# Patient Record
Sex: FEMALE
[De-identification: names, ages, dates, MRNs, and addresses within clinical notes are randomized per-mention and may not be internally consistent; named-entity substitution may affect disease eponyms.]

---

## 2024-06-21 ENCOUNTER — NON-APPOINTMENT (OUTPATIENT)
Age: 64
End: 2024-06-21

## 2024-06-21 DIAGNOSIS — M77.51 OTHER ENTHESOPATHY OF RT FOOT AND ANKLE: ICD-10-CM

## 2024-06-21 PROBLEM — Z00.00 ENCOUNTER FOR PREVENTIVE HEALTH EXAMINATION: Status: ACTIVE | Noted: 2024-06-21

## 2024-06-21 RX ORDER — MELOXICAM 15 MG/1
15 TABLET ORAL
Refills: 0 | Status: ACTIVE | COMMUNITY

## 2024-09-26 ENCOUNTER — APPOINTMENT (OUTPATIENT)
Dept: ORTHOPEDIC SURGERY | Facility: CLINIC | Age: 64
End: 2024-09-26
Payer: COMMERCIAL

## 2024-09-26 DIAGNOSIS — M17.12 UNILATERAL PRIMARY OSTEOARTHRITIS, LEFT KNEE: ICD-10-CM

## 2024-09-26 PROCEDURE — 99204 OFFICE O/P NEW MOD 45 MIN: CPT

## 2024-09-26 PROCEDURE — G2211 COMPLEX E/M VISIT ADD ON: CPT | Mod: NC

## 2024-09-26 PROCEDURE — 73564 X-RAY EXAM KNEE 4 OR MORE: CPT | Mod: LT

## 2024-09-26 NOTE — DISCUSSION/SUMMARY
[de-identified] :  OSMAR OSPINA is a 64 year old female who presents with left knee mild patellofemoral compartment arthritis.  Nonoperative treatment options for knee arthritis were discussed including anti-inflammatories, physical therapy, intraarticular cortisone injections, and hyaluronic acid gel injections.  A prescription for physical therapy was provided. She declined Meloxicam and will continue to use OTC NSAIDs.

## 2024-09-26 NOTE — HISTORY OF PRESENT ILLNESS
[de-identified] : Ms. OSMAR OSPINA is a 64 year old female presenting for evaluation of left knee pain. Her knee pain is localized anteriorly and around the knee cap. Patient notices the pain is worse with kneeling while gardening and with picking up her grand children. She has not had PT or injections thus far. She has tried NSAIDs with some improvement.

## 2024-09-26 NOTE — DISCUSSION/SUMMARY
[de-identified] :  OSMAR OSPINA is a 64 year old female who presents with left knee mild patellofemoral compartment arthritis.  Nonoperative treatment options for knee arthritis were discussed including anti-inflammatories, physical therapy, intraarticular cortisone injections, and hyaluronic acid gel injections.  A prescription for physical therapy was provided. She declined Meloxicam and will continue to use OTC NSAIDs.

## 2024-09-26 NOTE — PHYSICAL EXAM
[de-identified] :  The patient appears well nourished and in no apparent distress. The patient is alert and oriented to person, place, and time. Affect and mood appear normal. The head is normocephalic and atraumatic. The eyes reveal normal sclera and extra ocular muscles are intact. The tongue is midline with no apparent lesions. Skin shows normal turgor with no evidence of eczema or psoriasis. No respiratory distress noted. Sensation grossly intact. [de-identified] :  Exam of the left knee shows 0 to 145 degrees of flexion measured with a goniometer.   There is no effusion.  There is mild patellofemoral crepitance. There is mild pain with flexion. Nontender over quadriceps tendon and patella tendon. 5/5 motor strength bilaterally distally. Sensation intact distally. [de-identified] : X-ray: 4 views of the left knee demonstrate mild patellofemoral compartment arthritis

## 2024-09-26 NOTE — PHYSICAL EXAM
[de-identified] :  The patient appears well nourished and in no apparent distress. The patient is alert and oriented to person, place, and time. Affect and mood appear normal. The head is normocephalic and atraumatic. The eyes reveal normal sclera and extra ocular muscles are intact. The tongue is midline with no apparent lesions. Skin shows normal turgor with no evidence of eczema or psoriasis. No respiratory distress noted. Sensation grossly intact. [de-identified] :  Exam of the left knee shows 0 to 145 degrees of flexion measured with a goniometer.   There is no effusion.  There is mild patellofemoral crepitance. There is mild pain with flexion. Nontender over quadriceps tendon and patella tendon. 5/5 motor strength bilaterally distally. Sensation intact distally. [de-identified] : X-ray: 4 views of the left knee demonstrate mild patellofemoral compartment arthritis

## 2024-09-26 NOTE — HISTORY OF PRESENT ILLNESS
[de-identified] : Ms. OSMAR OSPINA is a 64 year old female presenting for evaluation of left knee pain. Her knee pain is localized anteriorly and around the knee cap. Patient notices the pain is worse with kneeling while gardening and with picking up her grand children. She has not had PT or injections thus far. She has tried NSAIDs with some improvement.